# Patient Record
Sex: MALE | Race: WHITE | NOT HISPANIC OR LATINO | URBAN - METROPOLITAN AREA
[De-identification: names, ages, dates, MRNs, and addresses within clinical notes are randomized per-mention and may not be internally consistent; named-entity substitution may affect disease eponyms.]

---

## 2017-06-26 PROBLEM — Z00.00 ENCOUNTER FOR PREVENTIVE HEALTH EXAMINATION: Status: ACTIVE | Noted: 2017-06-26

## 2017-06-27 ENCOUNTER — OUTPATIENT (OUTPATIENT)
Dept: OUTPATIENT SERVICES | Facility: HOSPITAL | Age: 62
LOS: 1 days | Discharge: HOME | End: 2017-06-27
Payer: COMMERCIAL

## 2017-06-27 DIAGNOSIS — C49.0 MALIGNANT NEOPLASM OF CONNECTIVE AND SOFT TISSUE OF HEAD, FACE AND NECK: ICD-10-CM

## 2017-06-29 ENCOUNTER — RESULT REVIEW (OUTPATIENT)
Age: 62
End: 2017-06-29

## 2017-06-29 ENCOUNTER — APPOINTMENT (OUTPATIENT)
Dept: HEMATOLOGY ONCOLOGY | Facility: CLINIC | Age: 62
End: 2017-06-29

## 2017-06-29 VITALS
BODY MASS INDEX: 27.13 KG/M2 | RESPIRATION RATE: 16 BRPM | HEIGHT: 75 IN | SYSTOLIC BLOOD PRESSURE: 120 MMHG | DIASTOLIC BLOOD PRESSURE: 78 MMHG | OXYGEN SATURATION: 98 % | TEMPERATURE: 98.5 F | WEIGHT: 218.24 LBS | HEART RATE: 60 BPM

## 2017-06-29 DIAGNOSIS — M75.101 UNSPECIFIED ROTATOR CUFF TEAR OR RUPTURE OF RIGHT SHOULDER, NOT SPECIFIED AS TRAUMATIC: ICD-10-CM

## 2017-06-29 DIAGNOSIS — Z86.018 PERSONAL HISTORY OF OTHER BENIGN NEOPLASM: ICD-10-CM

## 2017-06-29 PROCEDURE — 88321 CONSLTJ&REPRT SLD PREP ELSWR: CPT

## 2017-06-30 DIAGNOSIS — D48.9 NEOPLASM OF UNCERTAIN BEHAVIOR, UNSPECIFIED: ICD-10-CM

## 2017-10-16 ENCOUNTER — APPOINTMENT (OUTPATIENT)
Dept: HEMATOLOGY ONCOLOGY | Facility: CLINIC | Age: 62
End: 2017-10-16
Payer: COMMERCIAL

## 2017-10-16 VITALS — SYSTOLIC BLOOD PRESSURE: 131 MMHG | DIASTOLIC BLOOD PRESSURE: 85 MMHG | HEART RATE: 67 BPM | OXYGEN SATURATION: 94 %

## 2017-10-16 PROCEDURE — 99213 OFFICE O/P EST LOW 20 MIN: CPT

## 2018-08-09 ENCOUNTER — OTHER (OUTPATIENT)
Age: 63
End: 2018-08-09

## 2019-03-01 ENCOUNTER — OUTPATIENT (OUTPATIENT)
Dept: OUTPATIENT SERVICES | Facility: HOSPITAL | Age: 64
LOS: 1 days | Discharge: HOME | End: 2019-03-01

## 2019-03-01 DIAGNOSIS — D64.9 ANEMIA, UNSPECIFIED: ICD-10-CM

## 2019-03-04 ENCOUNTER — APPOINTMENT (OUTPATIENT)
Dept: HEMATOLOGY ONCOLOGY | Facility: CLINIC | Age: 64
End: 2019-03-04
Payer: COMMERCIAL

## 2019-03-04 VITALS
SYSTOLIC BLOOD PRESSURE: 157 MMHG | RESPIRATION RATE: 18 BRPM | DIASTOLIC BLOOD PRESSURE: 85 MMHG | TEMPERATURE: 98.6 F | OXYGEN SATURATION: 99 % | WEIGHT: 228.62 LBS | BODY MASS INDEX: 28.58 KG/M2 | HEART RATE: 58 BPM

## 2019-03-04 PROCEDURE — 99215 OFFICE O/P EST HI 40 MIN: CPT

## 2019-03-04 NOTE — PHYSICAL EXAM
[Fully active, able to carry on all pre-disease performance without restriction] : Status 0 - Fully active, able to carry on all pre-disease performance without restriction [Normal] : affect appropriate [de-identified] : Surgical incisions healed.

## 2019-03-04 NOTE — CONSULT LETTER
[Dear  ___] : Dear  [unfilled], [Consult Letter:] : I had the pleasure of evaluating your patient, [unfilled]. [Please see my note below.] : Please see my note below. [Consult Closing:] : Thank you very much for allowing me to participate in the care of this patient.  If you have any questions, please do not hesitate to contact me. [Sincerely,] : Sincerely, [FreeTextEntry2] : Tristan Bates MD, Hematology Oncology, [facility]\par Christopher Villaseñor MD, Transplanation Surgery, [facility] \par Aleks White MD, Interventional Radiology, [facility]  [FreeTextEntry1] : If anything I would propose ablation of existing disease if possible and then trying to delay recurrence with your proposal of pazopanib or similar vegf inhbitor. Response rate for EHE for any systemic therapy is under 10% by RECIST, but occasional people will respond to a variety of agents. I have had the best luck if anything with anthracyclines but would hold off on that approach given therapy that can be directed more specifically to the liver. We usually load pazopanib 800 mg oral x 2 days then decrease to 400 daily, since even 100 mg a day reaches therapeutic levels at least for VEGFR inhibition.  [FreeTextEntry3] : Pablito Villegas MD PhD FACP FASCO\par , [facility]\par Medical Director, [facility]\par Lead, Sarcoma Program\par Professor, [facility] [employer] [employer] at [facility] and \par [facility]\par Office: [address] [place] [address]\par TEL  [phone]\par  [phone]\par rebecca villegas md @ [email or web address] dot com

## 2019-03-04 NOTE — END OF VISIT
[] : Fellow [FreeTextEntry3] : ANTONETTE Conteh, not a fellow, is with whom I am attestisizing. In fact, I agree with the Hx, interval Hx, ROS, medication list, allergies, exam, lab review and plan as outlined.

## 2019-03-04 NOTE — ASSESSMENT
[Palliative Care Plan] : not applicable at this time [Supportive] : Goals of care discussed with patient: Supportive [FreeTextEntry1] : 62 M with what was initially a less advanced EHE than I typically see.  He was resected with negative margins, and now has a typical pattern of apparent metastatic disease. \par \par He presents with increased subcentimeter lesions (2/23/19) in the liver. We had a lengthy discussion on the management of these lesions.  We agree that given the extent of surgical resection, local control with ablation would be preferred assuming these small lesions can be targeted. TKIs (VEGF Inhibitors) are largely ineffective to treat metastatic EHE with no more than a 10% response rate. I have treated a number of people with a variety of agents and not found a specific agnet that works reproducibly for metastatic EHE, including pazopanib. However, an occasional person will respond well to any of a number of medications for reasons that are entirely unclear, especially given the characteristic translocation that defines this diagnosis.\par \par Notably EHE is not a purely vascular tumor; one does not find RBC coursing in lumens of groups EHE cells. There is likely an impact of the Hippo pathway on this diagnosis given the nature of the WWTR1-CAMTA1 translocation I was part of describing in the past several years (Tay et al). \par \par Yasir Briceno in [place] has a lab that studies in part EHE, and proposed trametinib for people with this diagnosis. For the few people I have treated with this drug off protocol, there have not been spectacular results. \par \par As a result I propose ablation if possible followed by observation or pazopanib. Mr Almeida would like to be active in his treatment plan (understanding one can always stop treatment systemically) and this approach may slow down appearance of other metastatic lesions. We also discussed embolization, radioembolization, RFA, cryo, and liver transplant as a variety of options for this diagnosis. \par \par I agree 3x a year liver MRI scans still make sense to track his metastases, and perhaps annual lung imaging to insure no other obvious metastatic disease, given the potential of EHE to metastasize to these sites. \par \par He will seek other opinions at other centers, and return on a PRN basis, perhaps in 4 months, perhaps not. \par

## 2019-03-04 NOTE — RESULTS/DATA
[FreeTextEntry1] : MRI liver 10/12/2017:  Partial liver resection noted. I do not see a recurrence. No final report available. \par \par MRI liver 2/23/19 : 0.5 cm hepatic lesion in the segment 5; this has increased in size and conspicuity, with possible punctate correlates on prior examination performed in January of 2018.  Additional 0.7 cm hepatic lesions in the segment 4a and 6 are overall stable but have increased in size from January 2018 but not from Nov 2018. We saw these images today ourselves\par \par \par

## 2019-03-04 NOTE — HISTORY OF PRESENT ILLNESS
[Disease: _____________________] : Disease: [unfilled] [T: ___] : T[unfilled] [N: ___] : N[unfilled] [M: ___] : M[unfilled] [AJCC Stage: ____] : AJCC Stage: [unfilled] [de-identified] : Mr Almeida is a now 63 M with CLL and EHE. He was in his USOH until\par \par 03/2013: Monoclonal IgM spike on workup for left femur pain. BMBX showed CLL, CD38 neg, ZAP70(+), unmutated (V3-11), Normal iFISH, NGS no mutations. 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 Two small liver densities noted, 8 mm largest\par \par 05/04/2017: CT after cycle 18: No new LAD, two liver lesions, segments 5 + 6, largest 14 mm, up from 8 mm. MR left leg with/without contrast without anatomic lesion; \par \par 06/15/2017: Bx of liver showed EHE\par \par 06/20/2017: Stopped acalabrutinib - minimal issues with headaches on drug, no worse than G1 toxicity, he noted.\par \par 06/29/2017: Feels well, no new complaints. Now seen for advice, opinion regarding EHE management options. \par \par 10/16/2017: RTC post op after partial right hepatectomy by laparoscopy. 4 day admit. Feels well post op. Margins apparently negative. \par \par 05/22/2018 of 1.1 cm right flank nodule apparently BENIGN. \par \par 08/2018 MR 01/18/2018, 08/06/2018 are ADDY at [facility]\par \par 03/04/2019: Mr. Almeida is here for a follow up. MRI pelvis on 2/23/19 at [facility]. There are small lesion in the liver MRI and some have grown and and a new lesion. Still quite small ranging 5-7  mm in size, increased from 3-4 mm in size looking back to January 2018. His liver surgeon discussed surgical resection that would include over 50 % of his liver being removed. RFA was also discussed as a preferred method given the extent of sx. Also discussion of TKI systemic therapy was mentioned. Overall he feels well without any positive ROS. \par \par Here to discuss advice, management and opinion of his diagnosis. \par  [de-identified] : See abovE.\par

## 2019-03-04 NOTE — REASON FOR VISIT
[Follow-Up Visit] : a follow-up [Spouse] : spouse [FreeTextEntry2] : GUSTAVO in 63 M, who also has CLL

## 2020-11-05 ENCOUNTER — TRANSCRIPTION ENCOUNTER (OUTPATIENT)
Age: 65
End: 2020-11-05

## 2021-07-12 ENCOUNTER — APPOINTMENT (OUTPATIENT)
Dept: INTERNAL MEDICINE | Facility: CLINIC | Age: 66
End: 2021-07-12
Payer: COMMERCIAL

## 2021-07-12 VITALS
RESPIRATION RATE: 16 BRPM | SYSTOLIC BLOOD PRESSURE: 132 MMHG | WEIGHT: 212 LBS | HEIGHT: 75 IN | TEMPERATURE: 97.8 F | DIASTOLIC BLOOD PRESSURE: 76 MMHG | OXYGEN SATURATION: 98 % | BODY MASS INDEX: 26.36 KG/M2 | HEART RATE: 58 BPM

## 2021-07-12 DIAGNOSIS — Z78.9 OTHER SPECIFIED HEALTH STATUS: ICD-10-CM

## 2021-07-12 DIAGNOSIS — R93.2 ABNORMAL FINDINGS ON DIAGNOSTIC IMAGING OF LIVER AND BILIARY TRACT: ICD-10-CM

## 2021-07-12 DIAGNOSIS — Z72.89 OTHER PROBLEMS RELATED TO LIFESTYLE: ICD-10-CM

## 2021-07-12 DIAGNOSIS — E55.9 VITAMIN D DEFICIENCY, UNSPECIFIED: ICD-10-CM

## 2021-07-12 DIAGNOSIS — Z83.3 FAMILY HISTORY OF DIABETES MELLITUS: ICD-10-CM

## 2021-07-12 DIAGNOSIS — C91.10 CHRONIC LYMPHOCYTIC LEUKEMIA OF B-CELL TYPE NOT HAVING ACHIEVED REMISSION: ICD-10-CM

## 2021-07-12 DIAGNOSIS — D48.9 NEOPLASM OF UNCERTAIN BEHAVIOR, UNSPECIFIED: ICD-10-CM

## 2021-07-12 DIAGNOSIS — G47.00 INSOMNIA, UNSPECIFIED: ICD-10-CM

## 2021-07-12 DIAGNOSIS — Z87.891 PERSONAL HISTORY OF NICOTINE DEPENDENCE: ICD-10-CM

## 2021-07-12 DIAGNOSIS — Z87.39 PERSONAL HISTORY OF OTHER DISEASES OF THE MUSCULOSKELETAL SYSTEM AND CONNECTIVE TISSUE: ICD-10-CM

## 2021-07-12 PROCEDURE — G0444 DEPRESSION SCREEN ANNUAL: CPT | Mod: 59

## 2021-07-12 PROCEDURE — 99072 ADDL SUPL MATRL&STAF TM PHE: CPT

## 2021-07-12 PROCEDURE — G0442 ANNUAL ALCOHOL SCREEN 15 MIN: CPT | Mod: 59

## 2021-07-12 PROCEDURE — 99205 OFFICE O/P NEW HI 60 MIN: CPT | Mod: 25

## 2021-07-12 RX ORDER — ZOLPIDEM TARTRATE 6.25 MG/1
6.25 TABLET, COATED ORAL
Refills: 0 | Status: ACTIVE | COMMUNITY

## 2021-07-12 RX ORDER — MELATONIN 3 MG
TABLET ORAL
Refills: 0 | Status: ACTIVE | COMMUNITY

## 2021-07-12 RX ORDER — ZOLPIDEM TARTRATE 6.25 MG/1
6.25 TABLET, EXTENDED RELEASE ORAL
Refills: 0 | Status: DISCONTINUED | COMMUNITY
Start: 2017-06-29 | End: 2021-07-12

## 2021-07-12 RX ORDER — NAPROXEN SODIUM 220 MG/1
220 CAPSULE, LIQUID FILLED ORAL
Refills: 0 | Status: DISCONTINUED | COMMUNITY
Start: 2017-06-29 | End: 2021-07-12

## 2021-07-12 NOTE — HISTORY OF PRESENT ILLNESS
[FreeTextEntry1] : 66-year-old male here to establish care and with several medical issues.  The patient defers his annual comprehensive evaluation because he last had one in October. [de-identified] : The patient has several medical issues that are being managed elsewhere.\par In 2015, the patient was diagnosed with CLL (chronic lymphocytic leukemia).  His condition was initially not treated, but was simply monitored.  In 2017, his white blood count started increasing dramatically and he received treatment with a biological for approximately 18 months.  Also in 2015, an MRI scan was done and the lesion was incidentally found in his liver that turned out to be EHE.  This was treated with a partial liver resection and continues to be monitored by the patient's physician at [facility].  The patient reports that his latest MRI shows several small lesions in his liver, but the decision was made to simply monitor these closely.  His CLL, which is currently not being treated with medication, is being monitored by the patient's physician at [facility].

## 2021-07-12 NOTE — HEALTH RISK ASSESSMENT
[0-4] : 0-4 [Yes] : Yes [2 - 3 times a week (3 pts)] : 2 - 3  times a week (3 points) [1 or 2 (0 pts)] : 1 or 2 (0 points) [Never (0 pts)] : Never (0 points) [No] : In the past 12 months have you used drugs other than those required for medical reasons? No [No falls in past year] : Patient reported no falls in the past year [0] : 2) Feeling down, depressed, or hopeless: Not at all (0) [PHQ-2 Negative - No further assessment needed] : PHQ-2 Negative - No further assessment needed [Fully functional (bathing, dressing, toileting, transferring, walking, feeding)] : Fully functional (bathing, dressing, toileting, transferring, walking, feeding) [Fully functional (using the telephone, shopping, preparing meals, housekeeping, doing laundry, using] : Fully functional and needs no help or supervision to perform IADLs (using the telephone, shopping, preparing meals, housekeeping, doing laundry, using transportation, managing medications and managing finances) [] : No [YearQuit] : 1986 [Audit-CScore] : 3 [[id number]] : 0

## 2021-07-12 NOTE — ASSESSMENT
[FreeTextEntry1] : Plan as above.\par \par The patient is going to check the dates that he received the following vaccines:\par Tdap\par Pneumovax (pneumococcal polysaccharide vaccine)\par Prevnar (pneumococcal covalent vaccine)\par \par The patient was advised to call for any problems or questions.\par \par Return visit in October for annual exam, sooner if needed.

## 2021-07-12 NOTE — PHYSICAL EXAM
[No Acute Distress] : no acute distress [Well Nourished] : well nourished [Well Developed] : well developed [Well-Appearing] : well-appearing [Normal Sclera/Conjunctiva] : normal sclera/conjunctiva [EOMI] : extraocular movements intact [Normal Outer Ear/Nose] : the outer ears and nose were normal in appearance [No JVD] : no jugular venous distention [No Lymphadenopathy] : no lymphadenopathy [Supple] : supple [Thyroid Normal, No Nodules] : the thyroid was normal and there were no nodules present [No Respiratory Distress] : no respiratory distress  [No Accessory Muscle Use] : no accessory muscle use [Clear to Auscultation] : lungs were clear to auscultation bilaterally [Normal Percussion] : the chest was normal to percussion [Normal Rate] : normal rate  [Regular Rhythm] : with a regular rhythm [Normal S1, S2] : normal S1 and S2 [No Murmur] : no murmur heard [No Carotid Bruits] : no carotid bruits [No Abdominal Bruit] : a ~M bruit was not heard ~T in the abdomen [No Varicosities] : no varicosities [Pedal Pulses Present] : the pedal pulses are present [No Edema] : there was no peripheral edema [No Palpable Aorta] : no palpable aorta [No Extremity Clubbing/Cyanosis] : no extremity clubbing/cyanosis [Soft] : abdomen soft [Non Tender] : non-tender [Non-distended] : non-distended [No Masses] : no abdominal mass palpated [No HSM] : no HSM [Normal Bowel Sounds] : normal bowel sounds [Normal Supraclavicular Nodes] : no supraclavicular lymphadenopathy [Normal Posterior Cervical Nodes] : no posterior cervical lymphadenopathy [Normal Anterior Cervical Nodes] : no anterior cervical lymphadenopathy [No CVA Tenderness] : no CVA  tenderness [No Spinal Tenderness] : no spinal tenderness [No Joint Swelling] : no joint swelling [Grossly Normal Strength/Tone] : grossly normal strength/tone [No Rash] : no rash [Coordination Grossly Intact] : coordination grossly intact [No Focal Deficits] : no focal deficits [Normal Gait] : normal gait [Speech Grossly Normal] : speech grossly normal [Memory Grossly Normal] : memory grossly normal [Normal Affect] : the affect was normal [Alert and Oriented x3] : oriented to person, place, and time [Normal Mood] : the mood was normal [Normal Insight/Judgement] : insight and judgment were intact

## 2021-07-13 ENCOUNTER — TRANSCRIPTION ENCOUNTER (OUTPATIENT)
Age: 66
End: 2021-07-13

## 2021-07-19 ENCOUNTER — NON-APPOINTMENT (OUTPATIENT)
Age: 66
End: 2021-07-19

## 2021-07-19 ENCOUNTER — TRANSCRIPTION ENCOUNTER (OUTPATIENT)
Age: 66
End: 2021-07-19

## 2021-11-02 ENCOUNTER — APPOINTMENT (OUTPATIENT)
Dept: INTERNAL MEDICINE | Facility: CLINIC | Age: 66
End: 2021-11-02